# Patient Record
Sex: FEMALE | Race: WHITE | NOT HISPANIC OR LATINO | URBAN - METROPOLITAN AREA
[De-identification: names, ages, dates, MRNs, and addresses within clinical notes are randomized per-mention and may not be internally consistent; named-entity substitution may affect disease eponyms.]

---

## 2017-02-10 ENCOUNTER — EMERGENCY (EMERGENCY)
Facility: HOSPITAL | Age: 42
LOS: 1 days | Discharge: HOME | End: 2017-02-10
Attending: EMERGENCY MEDICINE | Admitting: EMERGENCY MEDICINE
Payer: COMMERCIAL

## 2017-02-10 VITALS
WEIGHT: 100.09 LBS | HEART RATE: 78 BPM | DIASTOLIC BLOOD PRESSURE: 83 MMHG | TEMPERATURE: 98 F | OXYGEN SATURATION: 100 % | SYSTOLIC BLOOD PRESSURE: 121 MMHG | RESPIRATION RATE: 16 BRPM

## 2017-02-10 VITALS
TEMPERATURE: 98 F | RESPIRATION RATE: 15 BRPM | DIASTOLIC BLOOD PRESSURE: 80 MMHG | HEART RATE: 75 BPM | OXYGEN SATURATION: 100 % | SYSTOLIC BLOOD PRESSURE: 128 MMHG

## 2017-02-10 DIAGNOSIS — Z88.2 ALLERGY STATUS TO SULFONAMIDES: ICD-10-CM
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
## 2017-02-10 DIAGNOSIS — S80.02XA CONTUSION OF LEFT KNEE, INITIAL ENCOUNTER: ICD-10-CM

## 2017-02-10 DIAGNOSIS — E03.9 HYPOTHYROIDISM, UNSPECIFIED: ICD-10-CM

## 2017-02-10 DIAGNOSIS — G35 MULTIPLE SCLEROSIS: ICD-10-CM

## 2017-02-10 DIAGNOSIS — S09.90XA UNSPECIFIED INJURY OF HEAD, INITIAL ENCOUNTER: ICD-10-CM

## 2017-02-10 DIAGNOSIS — S50.02XA CONTUSION OF LEFT ELBOW, INITIAL ENCOUNTER: ICD-10-CM

## 2017-02-10 PROCEDURE — 73562 X-RAY EXAM OF KNEE 3: CPT

## 2017-02-10 PROCEDURE — 99284 EMERGENCY DEPT VISIT MOD MDM: CPT

## 2017-02-10 PROCEDURE — 99284 EMERGENCY DEPT VISIT MOD MDM: CPT | Mod: 25

## 2017-02-10 PROCEDURE — 72125 CT NECK SPINE W/O DYE: CPT

## 2017-02-10 PROCEDURE — 73070 X-RAY EXAM OF ELBOW: CPT

## 2017-02-10 PROCEDURE — 72125 CT NECK SPINE W/O DYE: CPT | Mod: 26

## 2017-02-10 PROCEDURE — 73562 X-RAY EXAM OF KNEE 3: CPT | Mod: 26,LT

## 2017-02-10 PROCEDURE — 70450 CT HEAD/BRAIN W/O DYE: CPT | Mod: 26

## 2017-02-10 PROCEDURE — 70450 CT HEAD/BRAIN W/O DYE: CPT

## 2017-02-10 PROCEDURE — 73070 X-RAY EXAM OF ELBOW: CPT | Mod: 26,LT

## 2017-02-10 RX ORDER — ACETAMINOPHEN 500 MG
650 TABLET ORAL ONCE
Qty: 0 | Refills: 0 | Status: COMPLETED | OUTPATIENT
Start: 2017-02-10 | End: 2017-02-10

## 2017-02-10 RX ORDER — IBUPROFEN 200 MG
1 TABLET ORAL
Qty: 20 | Refills: 0 | OUTPATIENT
Start: 2017-02-10 | End: 2017-02-15

## 2017-02-10 RX ADMIN — Medication 650 MILLIGRAM(S): at 11:52

## 2017-02-10 NOTE — ED PROVIDER NOTE - OBJECTIVE STATEMENT
42F with a history of MS, here following mechanical fall. She reports slip and fall on ice, + headstrike, no with sensitivity to light, left elbow and left knee pain. Also with left hand tingling. No LOC. No vomiting. No new weakness. no back pain. No CP or SOB. No abdominal pain. has been ambulatory since fall. Takes no anticoags.

## 2017-02-10 NOTE — ED PROVIDER NOTE - ATTENDING CONTRIBUTION TO CARE
43 yo F p/w slip and fall on ice today, co L knee pain and elbow pain. Pt also hit head. No loc. Mild neck soreness. No numb/ting/focal weak. no cp/sob/palp. no abd pain. no other inj or co.  Exam with no sig scalp edema, small contusion. Mild paraspinal cervical tend.  L elbow contusion with FROM, no laxity. L knee: no eff, FROm with no laxity  Check CT / XR, outpt fu

## 2017-02-10 NOTE — ED PROVIDER NOTE - PROGRESS NOTE DETAILS
findings consistent with MS on head CT and cervical CT which I discussed with patient, some cortical abnormality on elbow xray, will place in sling and recommend ortho f/u

## 2017-02-10 NOTE — ED ADULT NURSE NOTE - OBJECTIVE STATEMENT
pt slipped on ice and fell backwards, hitting her head left elbow and left knee and left buttock. pt presents with headache 3/10, and slight nausea.

## 2017-02-10 NOTE — ED PROVIDER NOTE - CARE PLAN
Principal Discharge DX:	Contusion of left elbow, initial encounter  Secondary Diagnosis:	Contusion of left knee, initial encounter  Secondary Diagnosis:	Head injury due to trauma, initial encounter

## 2017-02-10 NOTE — ED PROVIDER NOTE - MEDICAL DECISION MAKING DETAILS
Here with left knee and elbow pain following fall, tender on exam, will check xray to evaluate for fracture. Also with head injury and mild midline cervical tenderness, will also check head CT and cervical CT, tylenol for pain and observation.

## 2017-02-10 NOTE — ED PROVIDER NOTE - MUSCULOSKELETAL, MLM
lateral left elbow ttp, normal ROM, suprapatella tenderness, good ROM, no effusion, mild midline mid cervical tenderness, no midline thoracic or lumbar tenderness, Spine appears normal, range of motion is not limited, no muscle or joint tenderness

## 2017-02-14 ENCOUNTER — RX RENEWAL (OUTPATIENT)
Age: 42
End: 2017-02-14

## 2017-03-09 ENCOUNTER — APPOINTMENT (OUTPATIENT)
Dept: ENDOCRINOLOGY | Facility: CLINIC | Age: 42
End: 2017-03-09

## 2017-03-09 VITALS
WEIGHT: 107 LBS | DIASTOLIC BLOOD PRESSURE: 74 MMHG | BODY MASS INDEX: 20.46 KG/M2 | HEIGHT: 60.5 IN | HEART RATE: 86 BPM | SYSTOLIC BLOOD PRESSURE: 106 MMHG | OXYGEN SATURATION: 98 %

## 2017-03-09 RX ORDER — IBUPROFEN 600 MG/1
600 TABLET, FILM COATED ORAL
Qty: 20 | Refills: 0 | Status: COMPLETED | COMMUNITY
Start: 2017-02-10

## 2017-03-24 ENCOUNTER — APPOINTMENT (OUTPATIENT)
Dept: MRI IMAGING | Facility: CLINIC | Age: 42
End: 2017-03-24

## 2017-03-24 ENCOUNTER — OUTPATIENT (OUTPATIENT)
Dept: OUTPATIENT SERVICES | Facility: HOSPITAL | Age: 42
LOS: 1 days | End: 2017-03-24
Payer: COMMERCIAL

## 2017-03-24 DIAGNOSIS — Z00.8 ENCOUNTER FOR OTHER GENERAL EXAMINATION: ICD-10-CM

## 2017-03-24 PROCEDURE — A9585: CPT

## 2017-03-24 PROCEDURE — 70553 MRI BRAIN STEM W/O & W/DYE: CPT

## 2017-10-17 ENCOUNTER — APPOINTMENT (OUTPATIENT)
Dept: INTERNAL MEDICINE | Facility: CLINIC | Age: 42
End: 2017-10-17

## 2017-10-24 ENCOUNTER — APPOINTMENT (OUTPATIENT)
Dept: INTERNAL MEDICINE | Facility: CLINIC | Age: 42
End: 2017-10-24
Payer: COMMERCIAL

## 2017-10-24 DIAGNOSIS — Z23 ENCOUNTER FOR IMMUNIZATION: ICD-10-CM

## 2017-10-24 PROCEDURE — G0008: CPT

## 2017-10-24 PROCEDURE — 90686 IIV4 VACC NO PRSV 0.5 ML IM: CPT

## 2017-11-02 ENCOUNTER — APPOINTMENT (OUTPATIENT)
Dept: INTERNAL MEDICINE | Facility: CLINIC | Age: 42
End: 2017-11-02
Payer: COMMERCIAL

## 2017-11-02 VITALS
HEIGHT: 60.5 IN | HEART RATE: 80 BPM | OXYGEN SATURATION: 99 % | SYSTOLIC BLOOD PRESSURE: 120 MMHG | TEMPERATURE: 98 F | BODY MASS INDEX: 19.89 KG/M2 | DIASTOLIC BLOOD PRESSURE: 80 MMHG | WEIGHT: 104 LBS | RESPIRATION RATE: 14 BRPM

## 2017-11-02 PROCEDURE — 99214 OFFICE O/P EST MOD 30 MIN: CPT

## 2017-11-15 ENCOUNTER — RX RENEWAL (OUTPATIENT)
Age: 42
End: 2017-11-15

## 2018-02-14 ENCOUNTER — APPOINTMENT (OUTPATIENT)
Dept: INTERNAL MEDICINE | Facility: CLINIC | Age: 43
End: 2018-02-14
Payer: COMMERCIAL

## 2018-02-14 VITALS
DIASTOLIC BLOOD PRESSURE: 70 MMHG | HEIGHT: 60 IN | RESPIRATION RATE: 14 BRPM | SYSTOLIC BLOOD PRESSURE: 118 MMHG | WEIGHT: 105 LBS | BODY MASS INDEX: 20.62 KG/M2 | TEMPERATURE: 99 F | HEART RATE: 100 BPM | OXYGEN SATURATION: 98 %

## 2018-02-14 LAB — S PYO AG SPEC QL IA: NORMAL

## 2018-02-14 PROCEDURE — 87880 STREP A ASSAY W/OPTIC: CPT | Mod: QW

## 2018-02-14 PROCEDURE — 99214 OFFICE O/P EST MOD 30 MIN: CPT | Mod: 25

## 2018-03-05 ENCOUNTER — RX RENEWAL (OUTPATIENT)
Age: 43
End: 2018-03-05

## 2018-03-15 ENCOUNTER — APPOINTMENT (OUTPATIENT)
Dept: ENDOCRINOLOGY | Facility: CLINIC | Age: 43
End: 2018-03-15
Payer: COMMERCIAL

## 2018-03-15 VITALS
HEART RATE: 88 BPM | OXYGEN SATURATION: 98 % | WEIGHT: 108 LBS | BODY MASS INDEX: 21.2 KG/M2 | HEIGHT: 60 IN | SYSTOLIC BLOOD PRESSURE: 90 MMHG | DIASTOLIC BLOOD PRESSURE: 62 MMHG

## 2018-03-15 PROCEDURE — 99214 OFFICE O/P EST MOD 30 MIN: CPT

## 2018-03-15 RX ORDER — SUMATRIPTAN 100 MG/1
100 TABLET, FILM COATED ORAL
Qty: 9 | Refills: 0 | Status: DISCONTINUED | COMMUNITY
Start: 2017-06-06 | End: 2018-03-15

## 2018-03-15 RX ORDER — AZITHROMYCIN 250 MG/1
250 TABLET, FILM COATED ORAL
Qty: 1 | Refills: 0 | Status: DISCONTINUED | COMMUNITY
Start: 2017-11-02 | End: 2018-03-15

## 2018-10-09 PROBLEM — E03.9 HYPOTHYROIDISM, UNSPECIFIED: Chronic | Status: ACTIVE | Noted: 2017-02-10

## 2018-10-09 PROBLEM — G35 MULTIPLE SCLEROSIS: Chronic | Status: ACTIVE | Noted: 2017-02-10

## 2018-10-25 ENCOUNTER — APPOINTMENT (OUTPATIENT)
Dept: INTERNAL MEDICINE | Facility: CLINIC | Age: 43
End: 2018-10-25
Payer: COMMERCIAL

## 2018-10-25 PROCEDURE — 90686 IIV4 VACC NO PRSV 0.5 ML IM: CPT

## 2018-10-25 PROCEDURE — G0008: CPT

## 2018-12-22 ENCOUNTER — APPOINTMENT (OUTPATIENT)
Dept: INTERNAL MEDICINE | Facility: CLINIC | Age: 43
End: 2018-12-22
Payer: COMMERCIAL

## 2018-12-22 VITALS
OXYGEN SATURATION: 98 % | RESPIRATION RATE: 14 BRPM | HEIGHT: 60 IN | SYSTOLIC BLOOD PRESSURE: 116 MMHG | HEART RATE: 82 BPM | WEIGHT: 110 LBS | DIASTOLIC BLOOD PRESSURE: 64 MMHG | BODY MASS INDEX: 21.6 KG/M2 | TEMPERATURE: 98.3 F

## 2018-12-22 PROCEDURE — 99396 PREV VISIT EST AGE 40-64: CPT

## 2018-12-22 NOTE — HEALTH RISK ASSESSMENT
[Very Good] : ~his/her~  mood as very good [0] : 2) Feeling down, depressed, or hopeless: Not at all (0) [MammogramDate] : 11/18 [PapSmearDate] : 11/18

## 2019-03-21 ENCOUNTER — RX RENEWAL (OUTPATIENT)
Age: 44
End: 2019-03-21

## 2019-04-24 ENCOUNTER — APPOINTMENT (OUTPATIENT)
Dept: ENDOCRINOLOGY | Facility: CLINIC | Age: 44
End: 2019-04-24
Payer: COMMERCIAL

## 2019-04-24 VITALS
DIASTOLIC BLOOD PRESSURE: 64 MMHG | SYSTOLIC BLOOD PRESSURE: 120 MMHG | BODY MASS INDEX: 20.09 KG/M2 | WEIGHT: 112 LBS | HEART RATE: 85 BPM | HEIGHT: 62.5 IN | OXYGEN SATURATION: 99 %

## 2019-04-24 PROCEDURE — 99213 OFFICE O/P EST LOW 20 MIN: CPT

## 2019-04-24 NOTE — PHYSICAL EXAM
[Alert] : alert [No Acute Distress] : no acute distress [Well Nourished] : well nourished [Well Developed] : well developed [Normal Sclera/Conjunctiva] : normal sclera/conjunctiva [No Proptosis] : no proptosis [Normal Oropharynx] : the oropharynx was normal [Thyroid Not Enlarged] : the thyroid was not enlarged [No Thyroid Nodules] : there were no palpable thyroid nodules [No Respiratory Distress] : no respiratory distress [Clear to Auscultation] : lungs were clear to auscultation bilaterally [No Accessory Muscle Use] : no accessory muscle use [Normal Rate] : heart rate was normal  [Regular Rhythm] : with a regular rhythm [Normal S1, S2] : normal S1 and S2 [Normal Bowel Sounds] : normal bowel sounds [Not Tender] : non-tender [Not Distended] : not distended [Anterior Cervical Nodes] : anterior cervical nodes [Soft] : abdomen soft [Normal] : normal and non tender [No Spinal Tenderness] : no spinal tenderness [No Stigmata of Cushings Syndrome] : no stigmata of cushings syndrome [Spine Straight] : spine straight [Normal Gait] : normal gait [Normal Strength/Tone] : muscle strength and tone were normal [No Rash] : no rash [Oriented x3] : oriented to person, place, and time [Normal Reflexes] : deep tendon reflexes were 2+ and symmetric [No Tremors] : no tremors

## 2019-04-25 ENCOUNTER — TRANSCRIPTION ENCOUNTER (OUTPATIENT)
Age: 44
End: 2019-04-25

## 2019-04-26 ENCOUNTER — APPOINTMENT (OUTPATIENT)
Dept: MRI IMAGING | Facility: CLINIC | Age: 44
End: 2019-04-26

## 2019-04-26 NOTE — END OF VISIT
[FreeTextEntry3] : I, Kaye Abdi, authored this note working as a medical scribe for Dr. Sears.  04/24/2019.  3:45PM. \par This note was authored by Kaye Abdi working as medical scribe for me. I have reviewed, edited, and revised the note as needed. I am in agreement with the exam findings, imaging findings, and treatment plan.  Sohan Sears MD

## 2019-04-26 NOTE — HISTORY OF PRESENT ILLNESS
[FreeTextEntry1] : f/u 45 y/o female for hypothyroidism. \par \par No current sx's hypo- or hyperthyroidism except fatigue. No local neck pain or dysphagia. Menses regular.  no other new complaints. \par \par MS stable, sees Neuro , [place].\par \par Injured head - fell on ice FEb 10, 2017, imaging negative had migraines, much improved

## 2019-04-26 NOTE — ASSESSMENT
[FreeTextEntry1] : 43 y/o female with primary hypothyroidism. \par \par Clinically and chemically euthyroid on LT4 75. No local neck pain or dysphagia. Menses regular. Pt does not exercise regularly but will more so in the nicer weather. Sl wt increase which she attributed to stress. \par \par MS, gait stable. Pt will have repeat MRI done by neurology. Pt will have labs done then. \par \par f/u in 1 year. \par

## 2019-09-11 ENCOUNTER — NON-APPOINTMENT (OUTPATIENT)
Age: 44
End: 2019-09-11

## 2019-09-11 ENCOUNTER — APPOINTMENT (OUTPATIENT)
Dept: OPHTHALMOLOGY | Facility: CLINIC | Age: 44
End: 2019-09-11
Payer: COMMERCIAL

## 2019-09-11 PROCEDURE — 92083 EXTENDED VISUAL FIELD XM: CPT

## 2019-09-11 PROCEDURE — 92133 CPTRZD OPH DX IMG PST SGM ON: CPT

## 2019-09-11 PROCEDURE — 92004 COMPRE OPH EXAM NEW PT 1/>: CPT

## 2019-09-26 ENCOUNTER — APPOINTMENT (OUTPATIENT)
Dept: INTERNAL MEDICINE | Facility: CLINIC | Age: 44
End: 2019-09-26
Payer: COMMERCIAL

## 2019-09-26 VITALS
OXYGEN SATURATION: 97 % | HEIGHT: 62.5 IN | TEMPERATURE: 99.1 F | SYSTOLIC BLOOD PRESSURE: 100 MMHG | DIASTOLIC BLOOD PRESSURE: 60 MMHG | RESPIRATION RATE: 14 BRPM | HEART RATE: 75 BPM

## 2019-09-26 PROCEDURE — 99214 OFFICE O/P EST MOD 30 MIN: CPT

## 2019-09-26 RX ORDER — EPINEPHRINE 0.3 MG/.3ML
0.3 INJECTION INTRAMUSCULAR
Qty: 1 | Refills: 1 | Status: ACTIVE | COMMUNITY
Start: 2019-09-26 | End: 1900-01-01

## 2019-09-26 NOTE — PHYSICAL EXAM
[No Acute Distress] : no acute distress [Well Nourished] : well nourished [Well Developed] : well developed [Normal Sclera/Conjunctiva] : normal sclera/conjunctiva [Well-Appearing] : well-appearing [PERRL] : pupils equal round and reactive to light [EOMI] : extraocular movements intact [Normal Oropharynx] : the oropharynx was normal [Normal Outer Ear/Nose] : the outer ears and nose were normal in appearance [No Lymphadenopathy] : no lymphadenopathy [No JVD] : no jugular venous distention [Supple] : supple [Thyroid Normal, No Nodules] : the thyroid was normal and there were no nodules present [No Respiratory Distress] : no respiratory distress  [No Accessory Muscle Use] : no accessory muscle use [Clear to Auscultation] : lungs were clear to auscultation bilaterally [Normal Rate] : normal rate  [Regular Rhythm] : with a regular rhythm [No Murmur] : no murmur heard [Normal S1, S2] : normal S1 and S2 [No Abdominal Bruit] : a ~M bruit was not heard ~T in the abdomen [No Carotid Bruits] : no carotid bruits [No Varicosities] : no varicosities [Pedal Pulses Present] : the pedal pulses are present [No Edema] : there was no peripheral edema [No Extremity Clubbing/Cyanosis] : no extremity clubbing/cyanosis [No Palpable Aorta] : no palpable aorta [Soft] : abdomen soft [Non Tender] : non-tender [Non-distended] : non-distended [No Masses] : no abdominal mass palpated [No HSM] : no HSM [Normal Bowel Sounds] : normal bowel sounds [Normal Posterior Cervical Nodes] : no posterior cervical lymphadenopathy [No CVA Tenderness] : no CVA  tenderness [Normal Anterior Cervical Nodes] : no anterior cervical lymphadenopathy [No Spinal Tenderness] : no spinal tenderness [No Joint Swelling] : no joint swelling [Grossly Normal Strength/Tone] : grossly normal strength/tone [Coordination Grossly Intact] : coordination grossly intact [No Focal Deficits] : no focal deficits [Normal Gait] : normal gait [Deep Tendon Reflexes (DTR)] : deep tendon reflexes were 2+ and symmetric [Normal Affect] : the affect was normal [Normal Insight/Judgement] : insight and judgment were intact [de-identified] : Hives on lower legs, left arm, shoulders

## 2019-09-26 NOTE — HISTORY OF PRESENT ILLNESS
[FreeTextEntry8] : Pt co hives on both lower legs, left arm, both shoulders since yesterday.\par Pt reports that she has been using vitamin E oil for 2-3 days prior to the rash.

## 2019-09-26 NOTE — PLAN
[FreeTextEntry1] : allegra 180mg QD\par See Allergist, Dr. Boxer\par Pt understands that she must go to the ER if throat tightness and/or shortness of breath occur\par Pt knows how to use the epi-pen if necessary

## 2019-10-15 ENCOUNTER — APPOINTMENT (OUTPATIENT)
Dept: FAMILY MEDICINE | Facility: CLINIC | Age: 44
End: 2019-10-15

## 2020-01-14 ENCOUNTER — APPOINTMENT (OUTPATIENT)
Dept: INTERNAL MEDICINE | Facility: CLINIC | Age: 45
End: 2020-01-14
Payer: COMMERCIAL

## 2020-01-14 VITALS
HEIGHT: 62.5 IN | RESPIRATION RATE: 14 BRPM | OXYGEN SATURATION: 98 % | HEART RATE: 76 BPM | DIASTOLIC BLOOD PRESSURE: 84 MMHG | TEMPERATURE: 98.8 F | SYSTOLIC BLOOD PRESSURE: 122 MMHG | BODY MASS INDEX: 19.38 KG/M2 | WEIGHT: 108 LBS

## 2020-01-14 PROCEDURE — 99396 PREV VISIT EST AGE 40-64: CPT

## 2020-01-14 NOTE — HEALTH RISK ASSESSMENT
[Good] : ~his/her~ current health as good [0] : 2) Feeling down, depressed, or hopeless: Not at all (0) [Patient reported mammogram was normal] : Patient reported mammogram was normal [MammogramDate] : 08/19

## 2020-01-14 NOTE — HISTORY OF PRESENT ILLNESS
[de-identified] : Here for PE\par Complains of URI symptoms\par Mood is good.\par History of hypothyroidism- followed by endo\par pain in the injection site of right outer leg

## 2020-01-14 NOTE — ASSESSMENT
[FreeTextEntry1] : Hypothyroidism- follow up with endo\par URI- supportive care.\par check labs.\par Cyst in leg- warm soaks. QID. recheck in  7 days if persist.

## 2020-01-14 NOTE — PHYSICAL EXAM
[Well Nourished] : well nourished [Well Developed] : well developed [No Acute Distress] : no acute distress [Well-Appearing] : well-appearing [PERRL] : pupils equal round and reactive to light [Normal Sclera/Conjunctiva] : normal sclera/conjunctiva [Normal Oropharynx] : the oropharynx was normal [EOMI] : extraocular movements intact [Normal Outer Ear/Nose] : the outer ears and nose were normal in appearance [No Lymphadenopathy] : no lymphadenopathy [Supple] : supple [No JVD] : no jugular venous distention [No Respiratory Distress] : no respiratory distress  [No Accessory Muscle Use] : no accessory muscle use [Thyroid Normal, No Nodules] : the thyroid was normal and there were no nodules present [Normal Rate] : normal rate  [Regular Rhythm] : with a regular rhythm [Clear to Auscultation] : lungs were clear to auscultation bilaterally [Normal S1, S2] : normal S1 and S2 [No Murmur] : no murmur heard [No Abdominal Bruit] : a ~M bruit was not heard ~T in the abdomen [No Varicosities] : no varicosities [No Carotid Bruits] : no carotid bruits [No Palpable Aorta] : no palpable aorta [No Edema] : there was no peripheral edema [Pedal Pulses Present] : the pedal pulses are present [No Extremity Clubbing/Cyanosis] : no extremity clubbing/cyanosis [Soft] : abdomen soft [Non-distended] : non-distended [No Masses] : no abdominal mass palpated [Non Tender] : non-tender [No HSM] : no HSM [Normal Bowel Sounds] : normal bowel sounds [Normal Posterior Cervical Nodes] : no posterior cervical lymphadenopathy [No Spinal Tenderness] : no spinal tenderness [Normal Anterior Cervical Nodes] : no anterior cervical lymphadenopathy [No CVA Tenderness] : no CVA  tenderness [No Rash] : no rash [No Joint Swelling] : no joint swelling [Grossly Normal Strength/Tone] : grossly normal strength/tone [No Focal Deficits] : no focal deficits [Coordination Grossly Intact] : coordination grossly intact [Normal Gait] : normal gait [Deep Tendon Reflexes (DTR)] : deep tendon reflexes were 2+ and symmetric [Normal Insight/Judgement] : insight and judgment were intact [Normal Affect] : the affect was normal [de-identified] : right out leg movable tender small nodule with head

## 2020-01-14 NOTE — REVIEW OF SYSTEMS
[Fatigue] : fatigue [Nasal Discharge] : nasal discharge [Sore Throat] : sore throat [Cough] : cough [Negative] : Genitourinary

## 2020-04-13 ENCOUNTER — RX RENEWAL (OUTPATIENT)
Age: 45
End: 2020-04-13

## 2020-04-27 ENCOUNTER — APPOINTMENT (OUTPATIENT)
Dept: ENDOCRINOLOGY | Facility: CLINIC | Age: 45
End: 2020-04-27

## 2020-04-28 ENCOUNTER — APPOINTMENT (OUTPATIENT)
Dept: ENDOCRINOLOGY | Facility: CLINIC | Age: 45
End: 2020-04-28
Payer: COMMERCIAL

## 2020-04-28 PROCEDURE — 99213 OFFICE O/P EST LOW 20 MIN: CPT | Mod: 95

## 2020-04-28 RX ORDER — FLUTICASONE PROPIONATE 50 UG/1
50 SPRAY, METERED NASAL DAILY
Qty: 1 | Refills: 3 | Status: DISCONTINUED | COMMUNITY
Start: 2017-11-02 | End: 2020-04-28

## 2020-04-28 RX ORDER — METHYLPREDNISOLONE 4 MG/1
4 TABLET ORAL
Qty: 21 | Refills: 0 | Status: DISCONTINUED | COMMUNITY
Start: 2019-09-26 | End: 2020-04-28

## 2020-04-28 NOTE — ASSESSMENT
[FreeTextEntry1] : 44 y/o female with primary hypothyroidism. \par Patient appears clinically euthyroid on levothyroxine 75 mcg daily.\par  . No local neck pain or dysphagia. Menses regular. \par \par Request laboratory test to include repeat thyroid blood test when  patient comfortable with going to local laboratory.\par Levothyroxine renewed.\par f/u in 1 year. \par  [Levothyroxine] : The patient was instructed to take Levothyroxine on an empty stomach, separate from vitamins, and wait at least 30 minutes before eating

## 2020-04-28 NOTE — HISTORY OF PRESENT ILLNESS
[Home] : at home, [unfilled] , at the time of the visit. [Medical Office: ([facility])___] : at the medical office located in  [Patient] : the patient [Self] : self [FreeTextEntry1] : f/u 44 y/o female for hypothyroidism. \par Currently on levothyroxine 75 mcg daily.\par No current sx's hypo- or hyperthyroidism except fatigue. No local neck pain or dysphagia. Menses regular.  no other new complaints. \par \par MS stable, sees Neuro , [place].\par Remains premenopausal, menses regular, sees Dr. Anne\par

## 2020-08-12 ENCOUNTER — TRANSCRIPTION ENCOUNTER (OUTPATIENT)
Age: 45
End: 2020-08-12

## 2020-08-13 ENCOUNTER — TRANSCRIPTION ENCOUNTER (OUTPATIENT)
Age: 45
End: 2020-08-13

## 2020-09-04 ENCOUNTER — TRANSCRIPTION ENCOUNTER (OUTPATIENT)
Age: 45
End: 2020-09-04

## 2020-11-19 ENCOUNTER — NON-APPOINTMENT (OUTPATIENT)
Age: 45
End: 2020-11-19

## 2021-01-13 ENCOUNTER — TRANSCRIPTION ENCOUNTER (OUTPATIENT)
Age: 46
End: 2021-01-13

## 2021-02-09 ENCOUNTER — APPOINTMENT (OUTPATIENT)
Dept: INTERNAL MEDICINE | Facility: CLINIC | Age: 46
End: 2021-02-09
Payer: COMMERCIAL

## 2021-02-09 ENCOUNTER — NON-APPOINTMENT (OUTPATIENT)
Age: 46
End: 2021-02-09

## 2021-02-09 VITALS
DIASTOLIC BLOOD PRESSURE: 72 MMHG | RESPIRATION RATE: 14 BRPM | WEIGHT: 107 LBS | SYSTOLIC BLOOD PRESSURE: 106 MMHG | HEART RATE: 99 BPM | HEIGHT: 62.5 IN | OXYGEN SATURATION: 98 % | TEMPERATURE: 97.2 F | BODY MASS INDEX: 19.2 KG/M2

## 2021-02-09 PROCEDURE — 93000 ELECTROCARDIOGRAM COMPLETE: CPT

## 2021-02-09 PROCEDURE — 99396 PREV VISIT EST AGE 40-64: CPT | Mod: 25

## 2021-02-09 PROCEDURE — 99072 ADDL SUPL MATRL&STAF TM PHE: CPT

## 2021-02-09 RX ORDER — FESOTERODINE FUMARATE 8 MG/1
TABLET, FILM COATED, EXTENDED RELEASE ORAL
Refills: 0 | Status: DISCONTINUED | COMMUNITY
End: 2021-02-09

## 2021-02-09 RX ORDER — DIROXIMEL FUMARATE 231 MG/1
231 CAPSULE ORAL
Qty: 120 | Refills: 0 | Status: ACTIVE | COMMUNITY
Start: 2020-11-18

## 2021-02-09 RX ORDER — DIAZEPAM 5 MG/1
5 TABLET ORAL
Qty: 2 | Refills: 0 | Status: ACTIVE | COMMUNITY
Start: 2021-02-02

## 2021-02-09 RX ORDER — TROSPIUM CHLORIDE 60 MG/1
60 CAPSULE, EXTENDED RELEASE ORAL
Qty: 90 | Refills: 0 | Status: ACTIVE | COMMUNITY
Start: 2020-09-09

## 2021-02-09 RX ORDER — SILODOSIN 8 MG/1
8 CAPSULE ORAL
Refills: 0 | Status: ACTIVE | COMMUNITY
Start: 2020-10-29

## 2021-02-09 NOTE — PHYSICAL EXAM
[No Acute Distress] : no acute distress [Well Nourished] : well nourished [Well Developed] : well developed [Well-Appearing] : well-appearing [Normal Sclera/Conjunctiva] : normal sclera/conjunctiva [PERRL] : pupils equal round and reactive to light [EOMI] : extraocular movements intact [Normal Outer Ear/Nose] : the outer ears and nose were normal in appearance [No JVD] : no jugular venous distention [Normal Oropharynx] : the oropharynx was normal [No Lymphadenopathy] : no lymphadenopathy [Supple] : supple [Thyroid Normal, No Nodules] : the thyroid was normal and there were no nodules present [No Respiratory Distress] : no respiratory distress  [No Accessory Muscle Use] : no accessory muscle use [Clear to Auscultation] : lungs were clear to auscultation bilaterally [Normal Rate] : normal rate  [Regular Rhythm] : with a regular rhythm [Normal S1, S2] : normal S1 and S2 [No Murmur] : no murmur heard [No Carotid Bruits] : no carotid bruits [No Abdominal Bruit] : a ~M bruit was not heard ~T in the abdomen [No Varicosities] : no varicosities [Pedal Pulses Present] : the pedal pulses are present [No Palpable Aorta] : no palpable aorta [No Edema] : there was no peripheral edema [No Extremity Clubbing/Cyanosis] : no extremity clubbing/cyanosis [Soft] : abdomen soft [Non Tender] : non-tender [Non-distended] : non-distended [No Masses] : no abdominal mass palpated [No HSM] : no HSM [Normal Bowel Sounds] : normal bowel sounds [Normal Posterior Cervical Nodes] : no posterior cervical lymphadenopathy [Normal Anterior Cervical Nodes] : no anterior cervical lymphadenopathy [No CVA Tenderness] : no CVA  tenderness [No Spinal Tenderness] : no spinal tenderness [No Joint Swelling] : no joint swelling [Grossly Normal Strength/Tone] : grossly normal strength/tone [No Rash] : no rash [Coordination Grossly Intact] : coordination grossly intact [No Focal Deficits] : no focal deficits [Normal Gait] : normal gait [Deep Tendon Reflexes (DTR)] : deep tendon reflexes were 2+ and symmetric [Normal Affect] : the affect was normal [Normal Insight/Judgement] : insight and judgment were intact

## 2021-02-09 NOTE — ASSESSMENT
[FreeTextEntry1] : MS- follow up with  neuro\par Depression/ anxiety- follow up with psychiatry\par hypothyroidism- follow up with endo\par Check labs \par asthma- prn \par HCM [facility]

## 2021-02-09 NOTE — HEALTH RISK ASSESSMENT
[Good] : ~his/her~  mood as  good [] : No [Yes] : Yes [Monthly or less (1 pt)] : Monthly or less (1 point) [0] : 2) Feeling down, depressed, or hopeless: Not at all (0) [MammogramDate] : 08/20

## 2021-02-09 NOTE — HISTORY OF PRESENT ILLNESS
[de-identified] : Pt followed by neuro for MS ( [place])\par Followed by psychiatry ( Dayne Segura)\par Followed by hypothyroidism ( Renu)

## 2021-02-10 ENCOUNTER — TRANSCRIPTION ENCOUNTER (OUTPATIENT)
Age: 46
End: 2021-02-10

## 2021-02-11 NOTE — ED PROVIDER NOTE - AGGRAVATING FACTORS
Writer went into room to obtain blood work. Pt in room washing socks in the sink and requesting a robe. Pt given another hospital gown to wear as robe. Requesting writer come back in five minutes to obtain blood.   light

## 2021-03-30 ENCOUNTER — RX RENEWAL (OUTPATIENT)
Age: 46
End: 2021-03-30

## 2021-03-31 ENCOUNTER — TRANSCRIPTION ENCOUNTER (OUTPATIENT)
Age: 46
End: 2021-03-31

## 2021-05-03 ENCOUNTER — TRANSCRIPTION ENCOUNTER (OUTPATIENT)
Age: 46
End: 2021-05-03

## 2021-06-21 ENCOUNTER — RX RENEWAL (OUTPATIENT)
Age: 46
End: 2021-06-21

## 2021-06-29 ENCOUNTER — APPOINTMENT (OUTPATIENT)
Dept: ENDOCRINOLOGY | Facility: CLINIC | Age: 46
End: 2021-06-29
Payer: COMMERCIAL

## 2021-06-29 VITALS
TEMPERATURE: 100.1 F | OXYGEN SATURATION: 98 % | DIASTOLIC BLOOD PRESSURE: 64 MMHG | HEIGHT: 62.5 IN | WEIGHT: 108 LBS | SYSTOLIC BLOOD PRESSURE: 102 MMHG | BODY MASS INDEX: 19.38 KG/M2 | HEART RATE: 103 BPM

## 2021-06-29 PROCEDURE — 99072 ADDL SUPL MATRL&STAF TM PHE: CPT

## 2021-06-29 PROCEDURE — 99214 OFFICE O/P EST MOD 30 MIN: CPT

## 2021-06-29 NOTE — END OF VISIT
[FreeTextEntry3] : I, Bautista Styles, authored this note working as a medical scribe for Dr. Sears.  06/29/2021.  3:15PM. This note was authored by the medical scribe for me. I have reviewed, edited, and revised the note as needed. I am in agreement with the exam findings, imaging findings, and treatment plan.  Sohan Sears MD

## 2021-06-29 NOTE — HISTORY OF PRESENT ILLNESS
[Home] : at home, [unfilled] , at the time of the visit. [Medical Office: ([facility])___] : at the medical office located in  [Patient] : the patient [Self] : self [Vitamin D (oral)] : Vitamin D orally [FreeTextEntry1] : No significant interval health changes. No interval surgery, hospitalizations, fractures, or change in medications.\par \par Currently on levothyroxine 75 mcg daily. No local neck pain. No dysphagia or dysphonia. No raciness, shakiness, heat/cold intolerance, tiredness, or fatigue. No palpitations, tremors, or sudden weight gain/loss.\par \par Pt takes Vitamin D 2000 IU daily.\par \par Remains premenopausal, menses regular, sees Dr. Anne.\par \par MS stable, sees Neurology at [place]. Had resection of skin lesion at site of prior Avonex injections, R thigh lateral, benign

## 2021-06-29 NOTE — ASSESSMENT
[Levothyroxine] : The patient was instructed to take Levothyroxine on an empty stomach, separate from vitamins, and wait at least 30 minutes before eating [FreeTextEntry1] : 45 y/o female with primary hypothyroidism.\par \par Patient appears clinically euthyroid on levothyroxine 75 mcg daily. No local neck pain. No dysphagia or dysphonia. No raciness, shakiness, heat/cold intolerance, tiredness, or fatigue. No palpitations, tremors, or sudden weight gain/loss. Menses regular. Recent TSH slightly elevated. Repeat TFTs.\par \par Labs 3/2021 reviewed: TSH 4.16, normal.\par History of low vitamin D restarted vitamin D 2000 units/day.  Repeat vitamin D levels.\par Request labs sent out. Pt will have labs done w/ Dr. Lelo Mckeon.\par \par F/u in 1 year

## 2021-07-12 NOTE — ED PROVIDER NOTE - DURATION
Patient has a procedure on 7/26. She is wondering if she should take her nebulizer treatment that morning. She is going to have a root canal a week after the procedure and is wondering if this is okay.     Thank you   
Writer returned call & spoke with pt. All questions answered. Pt verbalized understanding and thankful for call.   
today

## 2021-09-14 ENCOUNTER — TRANSCRIPTION ENCOUNTER (OUTPATIENT)
Age: 46
End: 2021-09-14

## 2021-09-14 RX ORDER — VENLAFAXINE HYDROCHLORIDE 75 MG/1
75 CAPSULE, EXTENDED RELEASE ORAL
Refills: 0 | Status: DISCONTINUED | COMMUNITY
End: 2021-09-14

## 2021-09-15 ENCOUNTER — TRANSCRIPTION ENCOUNTER (OUTPATIENT)
Age: 46
End: 2021-09-15

## 2021-09-15 RX ORDER — DESVENLAFAXINE 25 MG/1
25 TABLET, EXTENDED RELEASE ORAL
Qty: 30 | Refills: 0 | Status: DISCONTINUED | COMMUNITY
Start: 2020-10-07 | End: 2021-09-15

## 2021-10-14 ENCOUNTER — APPOINTMENT (OUTPATIENT)
Dept: ULTRASOUND IMAGING | Facility: CLINIC | Age: 46
End: 2021-10-14
Payer: COMMERCIAL

## 2021-10-14 ENCOUNTER — APPOINTMENT (OUTPATIENT)
Dept: MAMMOGRAPHY | Facility: CLINIC | Age: 46
End: 2021-10-14
Payer: COMMERCIAL

## 2021-10-14 PROCEDURE — 76641 ULTRASOUND BREAST COMPLETE: CPT | Mod: 50

## 2021-10-14 PROCEDURE — 77067 SCR MAMMO BI INCL CAD: CPT

## 2021-10-14 PROCEDURE — 77063 BREAST TOMOSYNTHESIS BI: CPT

## 2021-11-15 ENCOUNTER — RX RENEWAL (OUTPATIENT)
Age: 46
End: 2021-11-15

## 2022-01-03 ENCOUNTER — TRANSCRIPTION ENCOUNTER (OUTPATIENT)
Age: 47
End: 2022-01-03

## 2022-01-19 ENCOUNTER — TRANSCRIPTION ENCOUNTER (OUTPATIENT)
Age: 47
End: 2022-01-19

## 2022-01-20 ENCOUNTER — TRANSCRIPTION ENCOUNTER (OUTPATIENT)
Age: 47
End: 2022-01-20

## 2022-02-15 ENCOUNTER — APPOINTMENT (OUTPATIENT)
Dept: INTERNAL MEDICINE | Facility: CLINIC | Age: 47
End: 2022-02-15
Payer: COMMERCIAL

## 2022-02-15 VITALS
WEIGHT: 108 LBS | HEART RATE: 101 BPM | BODY MASS INDEX: 19.38 KG/M2 | HEIGHT: 62.5 IN | RESPIRATION RATE: 14 BRPM | DIASTOLIC BLOOD PRESSURE: 66 MMHG | SYSTOLIC BLOOD PRESSURE: 118 MMHG | TEMPERATURE: 98.3 F | OXYGEN SATURATION: 99 %

## 2022-02-15 PROCEDURE — 99396 PREV VISIT EST AGE 40-64: CPT

## 2022-02-15 RX ORDER — TOPIRAMATE 25 MG/1
25 CAPSULE, COATED PELLETS ORAL
Qty: 60 | Refills: 0 | Status: DISCONTINUED | COMMUNITY
Start: 2021-01-05 | End: 2022-02-15

## 2022-02-15 NOTE — ASSESSMENT
[FreeTextEntry1] : Healthy female- check labs and EKG\par Hypothyroidism- follow up with endo\par VICTORINA- stable on med\par Migraines- stable on meds

## 2022-02-15 NOTE — PHYSICAL EXAM
[No Acute Distress] : no acute distress [Well Nourished] : well nourished [Well Developed] : well developed [Well-Appearing] : well-appearing [Normal Sclera/Conjunctiva] : normal sclera/conjunctiva [PERRL] : pupils equal round and reactive to light [EOMI] : extraocular movements intact [Normal Outer Ear/Nose] : the outer ears and nose were normal in appearance [Normal Oropharynx] : the oropharynx was normal [No JVD] : no jugular venous distention [No Lymphadenopathy] : no lymphadenopathy [Supple] : supple [Thyroid Normal, No Nodules] : the thyroid was normal and there were no nodules present [No Respiratory Distress] : no respiratory distress  [No Accessory Muscle Use] : no accessory muscle use [Clear to Auscultation] : lungs were clear to auscultation bilaterally [Normal Rate] : normal rate  [Regular Rhythm] : with a regular rhythm [Normal S1, S2] : normal S1 and S2 [No Murmur] : no murmur heard [No Carotid Bruits] : no carotid bruits [No Abdominal Bruit] : a ~M bruit was not heard ~T in the abdomen [No Varicosities] : no varicosities [Pedal Pulses Present] : the pedal pulses are present [No Edema] : there was no peripheral edema [No Palpable Aorta] : no palpable aorta [No Extremity Clubbing/Cyanosis] : no extremity clubbing/cyanosis [Soft] : abdomen soft [Non Tender] : non-tender [Non-distended] : non-distended [No Masses] : no abdominal mass palpated [No HSM] : no HSM [Normal Bowel Sounds] : normal bowel sounds [Normal Posterior Cervical Nodes] : no posterior cervical lymphadenopathy [Normal Anterior Cervical Nodes] : no anterior cervical lymphadenopathy [No CVA Tenderness] : no CVA  tenderness [No Spinal Tenderness] : no spinal tenderness [No Joint Swelling] : no joint swelling [Grossly Normal Strength/Tone] : grossly normal strength/tone [No Rash] : no rash [Coordination Grossly Intact] : coordination grossly intact [No Focal Deficits] : no focal deficits [Normal Gait] : normal gait [Deep Tendon Reflexes (DTR)] : deep tendon reflexes were 2+ and symmetric [Normal Affect] : the affect was normal [Normal Insight/Judgement] : insight and judgment were intact no

## 2022-02-16 LAB
APPEARANCE: CLEAR
BACTERIA: NEGATIVE
BILIRUBIN URINE: NEGATIVE
BLOOD URINE: NEGATIVE
COLOR: COLORLESS
GLUCOSE QUALITATIVE U: NEGATIVE
HYALINE CASTS: 0 /LPF
KETONES URINE: NEGATIVE
LEUKOCYTE ESTERASE URINE: ABNORMAL
MICROSCOPIC-UA: NORMAL
NITRITE URINE: NEGATIVE
PH URINE: 7
PROTEIN URINE: NEGATIVE
RED BLOOD CELLS URINE: 0 /HPF
SPECIFIC GRAVITY URINE: 1.01
SQUAMOUS EPITHELIAL CELLS: 4 /HPF
UROBILINOGEN URINE: NORMAL
WHITE BLOOD CELLS URINE: 5 /HPF

## 2022-06-30 ENCOUNTER — APPOINTMENT (OUTPATIENT)
Dept: ENDOCRINOLOGY | Facility: CLINIC | Age: 47
End: 2022-06-30

## 2022-06-30 VITALS
BODY MASS INDEX: 19.38 KG/M2 | OXYGEN SATURATION: 98 % | DIASTOLIC BLOOD PRESSURE: 72 MMHG | SYSTOLIC BLOOD PRESSURE: 100 MMHG | TEMPERATURE: 98.2 F | RESPIRATION RATE: 16 BRPM | WEIGHT: 108 LBS | HEART RATE: 94 BPM | HEIGHT: 62.5 IN

## 2022-06-30 PROCEDURE — 99213 OFFICE O/P EST LOW 20 MIN: CPT

## 2022-06-30 RX ORDER — ALBUTEROL SULFATE 90 UG/1
108 (90 BASE) INHALANT RESPIRATORY (INHALATION)
Qty: 1 | Refills: 2 | Status: DISCONTINUED | COMMUNITY
Start: 2022-02-15 | End: 2022-06-30

## 2022-06-30 RX ORDER — ASPIRIN 81 MG
81 TABLET, DELAYED RELEASE (ENTERIC COATED) ORAL
Refills: 0 | Status: ACTIVE | COMMUNITY

## 2022-06-30 RX ORDER — B-COMPLEX WITH VITAMIN C
TABLET ORAL
Refills: 0 | Status: ACTIVE | COMMUNITY

## 2022-06-30 RX ORDER — CLOBETASOL PROPIONATE 0.5 MG/ML
0.05 SOLUTION TOPICAL
Qty: 25 | Refills: 0 | Status: COMPLETED | COMMUNITY
Start: 2022-02-03

## 2022-07-01 NOTE — HISTORY OF PRESENT ILLNESS
[Vitamin D (oral)] : Vitamin D orally [FreeTextEntry1] : Patient returns for a follow up visit for  hypothyroidism. MS is stable. Pt reports fatigue. Pt takes baby aspirin once a day. Since the last visit pt has no significant interval health changes. No interval surgery, hospitalizations, fractures, or change in medications.Thyroid levels were slightly off. \par \par History\par Currently on levothyroxine 75 mcg daily. No local neck pain. No dysphagia or dysphonia. No raciness, shakiness, heat/cold intolerance, tiredness, or fatigue. No palpitations, tremors, or sudden weight gain/loss.\par \par Pt takes Vitamin D 2000 IU daily.\par \par Remains premenopausal, menses regular, sees Dr. Anne.\par \par MS stable, sees Neurology at [place]. Had resection of skin lesion at site of prior Avonex injections, R thigh lateral, benign

## 2022-07-01 NOTE — END OF VISIT
[FreeTextEntry3] : This note was authored by the medical scribe for me. I have reviewed, edited, and revised the note as needed. I am in agreement with the exam findings, imaging findings, and treatment plan.  Sohan Sears MD

## 2022-07-01 NOTE — ASSESSMENT
[Levothyroxine] : The patient was instructed to take Levothyroxine on an empty stomach, separate from vitamins, and wait at least 30 minutes before eating [FreeTextEntry1] : 46 y/o female with primary hypothyroidism.\par \par Patient appears clinically euthyroid on levothyroxine 75 mcg daily. No local neck pain. No dysphagia or dysphonia. No raciness, shakiness, heat/cold intolerance, tiredness, or fatigue. No palpitations, tremors, or sudden weight gain/loss. Menses regular. Recent TSH slightly elevated. Repeat TFTs.\par \par Thyroid medication dose will be changes as a result of thyroid blood tests. \par \par Labs 3/2021 reviewed: TSH 4.16, normal.\par History of low vitamin D restarted vitamin D 2000 units/day.  Repeat vitamin D levels.\par Request labs sent out. Pt will have labs done w/ Dr. Lelo Mckeon.\par April Tsh 8.42\par Vitamin d 35.3\par \par \par F/u in 1 year

## 2022-07-01 NOTE — PHYSICAL EXAM
[Alert] : alert [Well Nourished] : well nourished [No Acute Distress] : no acute distress [Well Developed] : well developed [Normal Sclera/Conjunctiva] : normal sclera/conjunctiva [EOMI] : extra ocular movement intact [No Proptosis] : no proptosis [Thyroid Not Enlarged] : the thyroid was not enlarged [No Thyroid Nodules] : no palpable thyroid nodules [Clear to Auscultation] : lungs were clear to auscultation bilaterally [Normal S1, S2] : normal S1 and S2 [Normal Rate] : heart rate was normal [Regular Rhythm] : with a regular rhythm [No Edema] : no peripheral edema [Normal Bowel Sounds] : normal bowel sounds [Not Tender] : non-tender [Not Distended] : not distended [Soft] : abdomen soft [Normal Anterior Cervical Nodes] : no anterior cervical lymphadenopathy [No Spinal Tenderness] : no spinal tenderness [Spine Straight] : spine straight [No Stigmata of Cushings Syndrome] : no stigmata of Cushings Syndrome [Normal Gait] : normal gait [Normal Reflexes] : deep tendon reflexes were 2+ and symmetric [No Tremors] : no tremors [Oriented x3] : oriented to person, place, and time [de-identified] : scar right lateral thigh

## 2022-07-14 ENCOUNTER — TRANSCRIPTION ENCOUNTER (OUTPATIENT)
Age: 47
End: 2022-07-14

## 2022-07-19 ENCOUNTER — NON-APPOINTMENT (OUTPATIENT)
Age: 47
End: 2022-07-19

## 2022-07-27 ENCOUNTER — TRANSCRIPTION ENCOUNTER (OUTPATIENT)
Age: 47
End: 2022-07-27

## 2022-07-28 ENCOUNTER — TRANSCRIPTION ENCOUNTER (OUTPATIENT)
Age: 47
End: 2022-07-28

## 2022-08-15 ENCOUNTER — RX RENEWAL (OUTPATIENT)
Age: 47
End: 2022-08-15

## 2022-10-17 ENCOUNTER — TRANSCRIPTION ENCOUNTER (OUTPATIENT)
Age: 47
End: 2022-10-17

## 2022-10-18 ENCOUNTER — TRANSCRIPTION ENCOUNTER (OUTPATIENT)
Age: 47
End: 2022-10-18

## 2022-10-19 ENCOUNTER — NON-APPOINTMENT (OUTPATIENT)
Age: 47
End: 2022-10-19

## 2022-10-19 ENCOUNTER — APPOINTMENT (OUTPATIENT)
Dept: ULTRASOUND IMAGING | Facility: CLINIC | Age: 47
End: 2022-10-19

## 2022-10-19 ENCOUNTER — APPOINTMENT (OUTPATIENT)
Dept: MAMMOGRAPHY | Facility: CLINIC | Age: 47
End: 2022-10-19

## 2022-10-19 PROCEDURE — 76641 ULTRASOUND BREAST COMPLETE: CPT | Mod: 50

## 2022-10-19 PROCEDURE — 77063 BREAST TOMOSYNTHESIS BI: CPT

## 2022-10-19 PROCEDURE — 77067 SCR MAMMO BI INCL CAD: CPT

## 2022-10-26 ENCOUNTER — APPOINTMENT (OUTPATIENT)
Dept: INTERNAL MEDICINE | Facility: CLINIC | Age: 47
End: 2022-10-26

## 2022-10-26 VITALS
BODY MASS INDEX: 20.62 KG/M2 | WEIGHT: 105 LBS | OXYGEN SATURATION: 98 % | SYSTOLIC BLOOD PRESSURE: 110 MMHG | HEIGHT: 60 IN | TEMPERATURE: 97.6 F | RESPIRATION RATE: 16 BRPM | DIASTOLIC BLOOD PRESSURE: 70 MMHG | HEART RATE: 91 BPM

## 2022-10-26 PROCEDURE — 99213 OFFICE O/P EST LOW 20 MIN: CPT

## 2022-10-26 NOTE — HISTORY OF PRESENT ILLNESS
[de-identified] : Pt here today for renewal of meds.\par Pt with anxiety due to situation.\par Pt unable to sleep at night.

## 2022-12-20 ENCOUNTER — RX RENEWAL (OUTPATIENT)
Age: 47
End: 2022-12-20

## 2023-01-10 ENCOUNTER — NON-APPOINTMENT (OUTPATIENT)
Age: 48
End: 2023-01-10

## 2023-01-10 ENCOUNTER — TRANSCRIPTION ENCOUNTER (OUTPATIENT)
Age: 48
End: 2023-01-10

## 2023-01-11 ENCOUNTER — TRANSCRIPTION ENCOUNTER (OUTPATIENT)
Age: 48
End: 2023-01-11

## 2023-01-11 ENCOUNTER — APPOINTMENT (OUTPATIENT)
Dept: INTERNAL MEDICINE | Facility: CLINIC | Age: 48
End: 2023-01-11

## 2023-01-12 ENCOUNTER — TRANSCRIPTION ENCOUNTER (OUTPATIENT)
Age: 48
End: 2023-01-12

## 2023-01-13 ENCOUNTER — APPOINTMENT (OUTPATIENT)
Dept: INTERNAL MEDICINE | Facility: CLINIC | Age: 48
End: 2023-01-13
Payer: COMMERCIAL

## 2023-01-13 VITALS
SYSTOLIC BLOOD PRESSURE: 98 MMHG | OXYGEN SATURATION: 99 % | TEMPERATURE: 99.1 F | DIASTOLIC BLOOD PRESSURE: 50 MMHG | HEIGHT: 60 IN | HEART RATE: 105 BPM | BODY MASS INDEX: 19.63 KG/M2 | WEIGHT: 100 LBS

## 2023-01-13 PROCEDURE — 99214 OFFICE O/P EST MOD 30 MIN: CPT

## 2023-01-13 NOTE — ASSESSMENT
[FreeTextEntry1] : acute URI- most likely viral URI. Pt to get bloods and viral swab\par Supportive care for now and return if persist.\par Chest xray done at urgent care which was negative.\par

## 2023-01-13 NOTE — HISTORY OF PRESENT ILLNESS
[FreeTextEntry8] : Pt with sinus pressure post nasal drip and cough.\par SYmptoms for 10 days.\par Went to urgent care and given doxycycline\par Pt feeling a little bitter\par less cough and congestion\par still with aches and some mild low grade temps.

## 2023-01-13 NOTE — REVIEW OF SYSTEMS
[Postnasal Drip] : postnasal drip [Cough] : cough [Fatigue] : fatigue [Nasal Discharge] : nasal discharge [Sore Throat] : sore throat [Negative] : Heme/Lymph

## 2023-01-17 ENCOUNTER — TRANSCRIPTION ENCOUNTER (OUTPATIENT)
Age: 48
End: 2023-01-17

## 2023-01-17 LAB
ALBUMIN SERPL ELPH-MCNC: 4.5 G/DL
ALP BLD-CCNC: 67 U/L
ALT SERPL-CCNC: 15 U/L
ANION GAP SERPL CALC-SCNC: 9 MMOL/L
AST SERPL-CCNC: 13 U/L
BASOPHILS # BLD AUTO: 0.04 K/UL
BASOPHILS NFR BLD AUTO: 0.5 %
BILIRUB SERPL-MCNC: 0.3 MG/DL
BUN SERPL-MCNC: 10 MG/DL
CALCIUM SERPL-MCNC: 9.8 MG/DL
CHLORIDE SERPL-SCNC: 101 MMOL/L
CMV IGG SERPL QL: <0.2 U/ML
CMV IGG SERPL-IMP: NEGATIVE
CMV IGM SERPL QL: <8 AU/ML
CMV IGM SERPL QL: NEGATIVE
CO2 SERPL-SCNC: 28 MMOL/L
CREAT SERPL-MCNC: 0.7 MG/DL
EBV EA AB SER IA-ACNC: >150 U/ML
EBV EA AB TITR SER IF: POSITIVE
EBV EA IGG SER QL IA: >600 U/ML
EBV EA IGG SER-ACNC: POSITIVE
EBV EA IGM SER IA-ACNC: POSITIVE
EBV PATRN SPEC IB-IMP: NORMAL
EBV VCA IGG SER IA-ACNC: 472 U/ML
EBV VCA IGM SER QL IA: 98.1 U/ML
EGFR: 107 ML/MIN/1.73M2
EOSINOPHIL # BLD AUTO: 0.06 K/UL
EOSINOPHIL NFR BLD AUTO: 0.7 %
EPSTEIN-BARR VIRUS CAPSID ANTIGEN IGG: POSITIVE
GLUCOSE SERPL-MCNC: 83 MG/DL
HCT VFR BLD CALC: 44.7 %
HGB BLD-MCNC: 14.8 G/DL
IMM GRANULOCYTES NFR BLD AUTO: 0.6 %
INFLUENZA A RESULT: NOT DETECTED
INFLUENZA B RESULT: NOT DETECTED
LYMPHOCYTES # BLD AUTO: 0.91 K/UL
LYMPHOCYTES NFR BLD AUTO: 11.3 %
MAN DIFF?: NORMAL
MCHC RBC-ENTMCNC: 30.1 PG
MCHC RBC-ENTMCNC: 33.1 GM/DL
MCV RBC AUTO: 90.9 FL
MONOCYTES # BLD AUTO: 0.49 K/UL
MONOCYTES NFR BLD AUTO: 6.1 %
NEUTROPHILS # BLD AUTO: 6.52 K/UL
NEUTROPHILS NFR BLD AUTO: 80.8 %
PLATELET # BLD AUTO: 377 K/UL
POTASSIUM SERPL-SCNC: 5.2 MMOL/L
PROT SERPL-MCNC: 7.2 G/DL
RBC # BLD: 4.92 M/UL
RBC # FLD: 13.1 %
RESP SYN VIRUS RESULT: NOT DETECTED
SARS-COV-2 RESULT: NOT DETECTED
SODIUM SERPL-SCNC: 138 MMOL/L
WBC # FLD AUTO: 8.07 K/UL

## 2023-01-18 ENCOUNTER — TRANSCRIPTION ENCOUNTER (OUTPATIENT)
Age: 48
End: 2023-01-18

## 2023-01-20 ENCOUNTER — TRANSCRIPTION ENCOUNTER (OUTPATIENT)
Age: 48
End: 2023-01-20

## 2023-02-28 ENCOUNTER — NON-APPOINTMENT (OUTPATIENT)
Age: 48
End: 2023-02-28

## 2023-02-28 ENCOUNTER — APPOINTMENT (OUTPATIENT)
Dept: INTERNAL MEDICINE | Facility: CLINIC | Age: 48
End: 2023-02-28
Payer: COMMERCIAL

## 2023-02-28 VITALS
RESPIRATION RATE: 16 BRPM | OXYGEN SATURATION: 99 % | HEIGHT: 60 IN | BODY MASS INDEX: 21.79 KG/M2 | DIASTOLIC BLOOD PRESSURE: 56 MMHG | SYSTOLIC BLOOD PRESSURE: 108 MMHG | WEIGHT: 111 LBS | HEART RATE: 96 BPM

## 2023-02-28 PROCEDURE — 93000 ELECTROCARDIOGRAM COMPLETE: CPT | Mod: 59

## 2023-02-28 PROCEDURE — 99396 PREV VISIT EST AGE 40-64: CPT | Mod: 25

## 2023-02-28 NOTE — HEALTH RISK ASSESSMENT
[Very Good] : ~his/her~  mood as very good [No falls in past year] : Patient reported no falls in the past year [0] : 2) Feeling down, depressed, or hopeless: Not at all (0) [PHQ-2 Negative - No further assessment needed] : PHQ-2 Negative - No further assessment needed [[id number]] : 0 [Patient reported mammogram was normal] : Patient reported mammogram was normal [Patient reported PAP Smear was normal] : Patient reported PAP Smear was normal [MammogramDate] : 10/22 [PapSmearDate] : 04/22 [ColonoscopyComments] : inez to see sayedy

## 2023-03-14 ENCOUNTER — LABORATORY RESULT (OUTPATIENT)
Age: 48
End: 2023-03-14

## 2023-03-14 ENCOUNTER — APPOINTMENT (OUTPATIENT)
Dept: GASTROENTEROLOGY | Facility: CLINIC | Age: 48
End: 2023-03-14
Payer: COMMERCIAL

## 2023-03-14 VITALS
HEIGHT: 60 IN | WEIGHT: 110 LBS | SYSTOLIC BLOOD PRESSURE: 104 MMHG | HEART RATE: 87 BPM | BODY MASS INDEX: 21.6 KG/M2 | DIASTOLIC BLOOD PRESSURE: 70 MMHG | OXYGEN SATURATION: 98 %

## 2023-03-14 DIAGNOSIS — K58.9 IRRITABLE BOWEL SYNDROME W/OUT DIARRHEA: ICD-10-CM

## 2023-03-14 DIAGNOSIS — Z12.12 ENCOUNTER FOR SCREENING FOR MALIGNANT NEOPLASM OF COLON: ICD-10-CM

## 2023-03-14 DIAGNOSIS — G35 MULTIPLE SCLEROSIS: ICD-10-CM

## 2023-03-14 DIAGNOSIS — J45.909 UNSPECIFIED ASTHMA, UNCOMPLICATED: ICD-10-CM

## 2023-03-14 DIAGNOSIS — Z12.11 ENCOUNTER FOR SCREENING FOR MALIGNANT NEOPLASM OF COLON: ICD-10-CM

## 2023-03-14 PROCEDURE — 99204 OFFICE O/P NEW MOD 45 MIN: CPT

## 2023-03-14 NOTE — ASSESSMENT
[FreeTextEntry1] : IMPRESSION:  \par #  Due for a screening test for colon polyps/early stage colon cancer\par -  Patient denies family history of GI cancers.\par -  No large colon polyps in the FDR\par -  Never had a colonoscopy \par \par #  IBS - irregular BMs.  \par -  EGD by Dr. Jonah Slater 2013:  Nml esophagus (neg path).  Mild antral erythema s/p bx (neg for hp and IM).  Nml duodenum s/p bx (neg for celiac disease). \par \par #  Comorbidities:  History of anxiety, asthma, hypothyroidism, Multiple sclerosis, optic neuritis \par \par \par PLAN: \par I had a long discussion with the patient regarding colon cancer in the [place].    \par \par We reviewed risk factors for colon cancer which include age, having a family hx of colon cancer, cancer syndromes, personal hx of inflammatory disease, having comorbidities such as obesity, DM, cardiac disease, having nicotine addiction, alcohol addiction,etc.  \par \par Patient is average risk for colon cancer.  \par \par We reviewed the screening tests for colon cancer prevention.  We discussed screening tests such as stool test, CT scans such as CT colonography, and a colonoscopy.    Patient was made aware that despite these screening test, a cancerous lesion can still be missed.  The gold standard test is a screening colonoscopy.\par \par I have advised the patient to arrange for a colonoscopy to rule out colon polyps, colorectal cancer etc. under monitored anesthesia care.  Risks such as perforation requiring surgery, colostomy, bleeding requiring blood transfusion, infection/sepsis, diverticulitis, colitis, missed colon cancer (2% to 6%), internal organ injury such as splenic hemorrhage etc, adverse reaction to medication etc. and risks of anesthesia including cardiopulmonary compromise requiring ICU care were discussed with patient.  Alternatives were discussed.  Patient verbalized understanding and agrees to proceed with a colonoscopy under anesthesia.\par

## 2023-03-14 NOTE — PHYSICAL EXAM
[Bowel Sounds] : normal bowel sounds [Abdomen Tenderness] : non-tender [Abdomen Soft] : soft [No Masses] : no abdominal mass palpated [Cervical Lymph Nodes Enlarged Posterior Bilaterally] : no posterior cervical lymphadenopathy [Supraclavicular Lymph Nodes Enlarged Bilaterally] : no supraclavicular lymphadenopathy [Abnormal Walk] : normal gait [No Clubbing, Cyanosis] : no clubbing or cyanosis of the fingernails [Normal Color / Pigmentation] : normal skin color and pigmentation [] : no rash [Normal] : oriented to person, place, and time

## 2023-03-14 NOTE — HISTORY OF PRESENT ILLNESS
[FreeTextEntry1] : 49 y/o mother of two, with Hx of anxiety, asthma, hypothyroidism, IBS, Multiple sclerosis, optic neuritis who presents for a screening colonoscopy. \par \par Never had a colonoscopy.\par Feels well. Was diagnosed with Mono - lost weight and then gained back.  \par Has a history of irregular bowel movements - diagnosed with IBS in past. At times wakes up in the middle of the night to have a bowel movement.\par \par Sometimes heartburn.  Patient denies having difficulty swallowing, painful swallowing, nausea, vomiting, melena and hematochezia.\par \par Aunt passed away in her late 20s from cancer of unknown origin.  \par Patient denies family history of GI cancers.\par All other review of systems are negative. \par \par \par Initial visit with Dr. Jonah Slater in 6/2013: \par "I have a history of stomach issues."\par \par Since 1-2/13 worse. Not bad now, but 20 year history of getting a "stomach ache out of nowhere" followed by many trips to bathroom, and then will be bad for a few days and then fine for anywhere from a few weeks to many months.\par \par Since 2/3 was having multiple episodes. Would treat self with Pepto or Imodium and then would be "clogged up" for a week.\par \par When gets an attack of pain, has to get to bathroom urgently. Worse since had children (greater urgency). Has had incontinence x 3 in the past 5 years. Two since 2/12.\par \par Pain: above umbilicus, sharp, burning, crampy. Resolves (usually) with BM. Can be "on and off the toilet for the next hour." \par \par Stress increases frequency of attacks. \par Foods: creamy or heavy foods. But the episodes that bring her in are not predictable. \par \par No nocturnal episodes. Does not know if has had blood in stool. Mother has IBS. \par  \par \par \par EGD by Dr. Jonah Slater 2013:  Nml esophagus (neg path).  Mild antral erythema s/p bx (neg for hp and IM).  Nml duodenum s/p bx (neg for celiac disease).

## 2023-03-15 ENCOUNTER — TRANSCRIPTION ENCOUNTER (OUTPATIENT)
Age: 48
End: 2023-03-15

## 2023-03-16 ENCOUNTER — TRANSCRIPTION ENCOUNTER (OUTPATIENT)
Age: 48
End: 2023-03-16

## 2023-05-15 NOTE — ED PROVIDER NOTE - CONSTITUTIONAL NEGATIVE STATEMENT, MLM
no fever and no chills. Nsaids Pregnancy And Lactation Text: These medications are considered safe up to 30 weeks gestation. It is excreted in breast milk.

## 2023-05-21 ENCOUNTER — TRANSCRIPTION ENCOUNTER (OUTPATIENT)
Age: 48
End: 2023-05-21

## 2023-05-22 ENCOUNTER — APPOINTMENT (OUTPATIENT)
Dept: GASTROENTEROLOGY | Facility: HOSPITAL | Age: 48
End: 2023-05-22

## 2023-05-22 ENCOUNTER — OUTPATIENT (OUTPATIENT)
Dept: OUTPATIENT SERVICES | Facility: HOSPITAL | Age: 48
LOS: 1 days | End: 2023-05-22
Payer: COMMERCIAL

## 2023-05-22 DIAGNOSIS — Z12.11 ENCOUNTER FOR SCREENING FOR MALIGNANT NEOPLASM OF COLON: ICD-10-CM

## 2023-05-22 LAB — HCG UR QL: NEGATIVE — SIGNIFICANT CHANGE UP

## 2023-05-22 PROCEDURE — 45378 DIAGNOSTIC COLONOSCOPY: CPT

## 2023-05-22 PROCEDURE — G0121: CPT

## 2023-05-22 PROCEDURE — 81025 URINE PREGNANCY TEST: CPT

## 2023-06-23 ENCOUNTER — TRANSCRIPTION ENCOUNTER (OUTPATIENT)
Age: 48
End: 2023-06-23

## 2023-06-23 ENCOUNTER — NON-APPOINTMENT (OUTPATIENT)
Age: 48
End: 2023-06-23

## 2023-06-27 ENCOUNTER — APPOINTMENT (OUTPATIENT)
Dept: INTERNAL MEDICINE | Facility: CLINIC | Age: 48
End: 2023-06-27
Payer: COMMERCIAL

## 2023-06-27 PROCEDURE — 99212 OFFICE O/P EST SF 10 MIN: CPT | Mod: 95

## 2023-06-27 NOTE — HISTORY OF PRESENT ILLNESS
[de-identified] : Pt having anxiety.\par Pt having upcoming trip to [place] and anxious about the trip.\par Pt with difficulty with sleep.\par Mood is good [Home] : at home, [unfilled] , at the time of the visit. [Medical Office: ([facility])___] : at the medical office located in  [Verbal consent obtained from patient] : the patient, [unfilled]

## 2023-06-27 NOTE — ASSESSMENT
[FreeTextEntry1] : anxiety- xanax renewed. Only to use sparingly,. istop checked\par Time spent on phone and charting 18 minutes

## 2023-06-28 ENCOUNTER — APPOINTMENT (OUTPATIENT)
Dept: ENDOCRINOLOGY | Facility: CLINIC | Age: 48
End: 2023-06-28
Payer: COMMERCIAL

## 2023-06-28 VITALS
OXYGEN SATURATION: 98 % | HEIGHT: 60 IN | SYSTOLIC BLOOD PRESSURE: 98 MMHG | DIASTOLIC BLOOD PRESSURE: 70 MMHG | HEART RATE: 104 BPM

## 2023-06-28 VITALS — BODY MASS INDEX: 21.09 KG/M2 | WEIGHT: 108 LBS

## 2023-06-28 PROCEDURE — 99213 OFFICE O/P EST LOW 20 MIN: CPT

## 2023-06-28 RX ORDER — SODIUM SULFATE, POTASSIUM SULFATE AND MAGNESIUM SULFATE 1.6; 3.13; 17.5 G/177ML; G/177ML; G/177ML
17.5-3.13-1.6 SOLUTION ORAL
Qty: 1 | Refills: 0 | Status: DISCONTINUED | COMMUNITY
Start: 2023-03-14 | End: 2023-06-28

## 2023-06-28 RX ORDER — RIMEGEPANT SULFATE 75 MG/75MG
TABLET, ORALLY DISINTEGRATING ORAL
Qty: 16 | Refills: 0 | Status: ACTIVE | COMMUNITY
Start: 2023-05-02

## 2023-06-28 RX ORDER — NITROFURANTOIN (MONOHYDRATE/MACROCRYSTALS) 25; 75 MG/1; MG/1
100 CAPSULE ORAL
Qty: 14 | Refills: 0 | Status: COMPLETED | COMMUNITY
Start: 2023-06-05

## 2023-06-28 RX ORDER — METRONIDAZOLE 7.5 MG/G
0.75 GEL VAGINAL
Qty: 70 | Refills: 0 | Status: COMPLETED | COMMUNITY
Start: 2023-05-25

## 2023-06-28 RX ORDER — DOXYCYCLINE HYCLATE 100 MG/1
100 CAPSULE ORAL
Qty: 14 | Refills: 0 | Status: COMPLETED | COMMUNITY
Start: 2023-01-08

## 2023-06-29 NOTE — PHYSICAL EXAM
[Alert] : alert [Well Nourished] : well nourished [No Acute Distress] : no acute distress [Well Developed] : well developed [Normal Sclera/Conjunctiva] : normal sclera/conjunctiva [EOMI] : extra ocular movement intact [No Proptosis] : no proptosis [Thyroid Not Enlarged] : the thyroid was not enlarged [No Thyroid Nodules] : no palpable thyroid nodules [Clear to Auscultation] : lungs were clear to auscultation bilaterally [Normal S1, S2] : normal S1 and S2 [Normal Rate] : heart rate was normal [Regular Rhythm] : with a regular rhythm [No Edema] : no peripheral edema [Normal Bowel Sounds] : normal bowel sounds [Not Tender] : non-tender [Not Distended] : not distended [Soft] : abdomen soft [Normal Anterior Cervical Nodes] : no anterior cervical lymphadenopathy [No Spinal Tenderness] : no spinal tenderness [Spine Straight] : spine straight [No Stigmata of Cushings Syndrome] : no stigmata of Cushings Syndrome [Normal Gait] : normal gait [Normal Reflexes] : deep tendon reflexes were 2+ and symmetric [No Tremors] : no tremors [Oriented x3] : oriented to person, place, and time [de-identified] : scar right lateral thigh

## 2023-06-29 NOTE — ASSESSMENT
[Levothyroxine] : The patient was instructed to take Levothyroxine on an empty stomach, separate from vitamins, and wait at least 30 minutes before eating [FreeTextEntry1] : 47 y/o female with primary hypothyroidism.\par \par Patient appears clinically euthyroid on levothyroxine 75 mcg daily. No local neck pain. No dysphagia or dysphonia. No raciness, shakiness, heat/cold intolerance, tiredness, or fatigue. No palpitations, tremors, or sudden weight gain/loss. Menses regular. Recent TSH normal 2.85 \par Perimenopausal with some hot flashes.  Patient is not considering medical therapy such as contraceptives to control symptoms during this time phase. \par \par F/u in 1 year

## 2023-06-29 NOTE — HISTORY OF PRESENT ILLNESS
[Vitamin D (oral)] : Vitamin D orally [FreeTextEntry1] : Patient returns for a follow up visit for  hypothyroidism. MS is stable. Pt reports fatigue. Pt takes baby aspirin once a day. Since the last visit pt has no significant interval health changes. No interval surgery, hospitalizations, fractures, or change in medications.Thyroid levels were slightly off. \par \par History\par Currently on levothyroxine 75 mcg daily. No local neck pain. No dysphagia or dysphonia. No raciness, shakiness, heat/cold intolerance, tiredness, or fatigue. No palpitations, tremors, or sudden weight gain/loss.\par \par Pt takes Vitamin D 2000 IU daily.\par Perimenopausal, irreg menses with hot flashes, has not disc options such as OBCP\par \par MS stable, sees Neurology at [place].

## 2023-10-19 ENCOUNTER — APPOINTMENT (OUTPATIENT)
Dept: ULTRASOUND IMAGING | Facility: CLINIC | Age: 48
End: 2023-10-19
Payer: COMMERCIAL

## 2023-10-19 ENCOUNTER — APPOINTMENT (OUTPATIENT)
Dept: MAMMOGRAPHY | Facility: CLINIC | Age: 48
End: 2023-10-19
Payer: COMMERCIAL

## 2023-10-19 PROCEDURE — 77063 BREAST TOMOSYNTHESIS BI: CPT

## 2023-10-19 PROCEDURE — 76641 ULTRASOUND BREAST COMPLETE: CPT | Mod: 50

## 2023-10-19 PROCEDURE — 77067 SCR MAMMO BI INCL CAD: CPT

## 2024-03-01 ENCOUNTER — APPOINTMENT (OUTPATIENT)
Dept: INTERNAL MEDICINE | Facility: CLINIC | Age: 49
End: 2024-03-01
Payer: COMMERCIAL

## 2024-03-01 VITALS
HEART RATE: 92 BPM | BODY MASS INDEX: 22.78 KG/M2 | OXYGEN SATURATION: 98 % | TEMPERATURE: 98.4 F | HEIGHT: 60 IN | RESPIRATION RATE: 16 BRPM | WEIGHT: 116 LBS | SYSTOLIC BLOOD PRESSURE: 118 MMHG | DIASTOLIC BLOOD PRESSURE: 74 MMHG

## 2024-03-01 DIAGNOSIS — F41.9 ANXIETY DISORDER, UNSPECIFIED: ICD-10-CM

## 2024-03-01 DIAGNOSIS — Z00.00 ENCOUNTER FOR GENERAL ADULT MEDICAL EXAMINATION W/OUT ABNORMAL FINDINGS: ICD-10-CM

## 2024-03-01 DIAGNOSIS — E55.9 VITAMIN D DEFICIENCY, UNSPECIFIED: ICD-10-CM

## 2024-03-01 PROCEDURE — 99396 PREV VISIT EST AGE 40-64: CPT

## 2024-03-01 RX ORDER — UBROGEPANT 100 MG/1
100 TABLET ORAL
Qty: 24 | Refills: 0 | Status: DISCONTINUED | COMMUNITY
Start: 2022-02-16 | End: 2024-03-01

## 2024-03-01 RX ORDER — ALPRAZOLAM 0.5 MG/1
0.5 TABLET ORAL
Qty: 30 | Refills: 0 | Status: ACTIVE | COMMUNITY
Start: 2019-09-26 | End: 1900-01-01

## 2024-03-01 RX ORDER — DESVENLAFAXINE 50 MG/1
50 TABLET, EXTENDED RELEASE ORAL
Qty: 90 | Refills: 3 | Status: ACTIVE | COMMUNITY
Start: 2021-09-15 | End: 1900-01-01

## 2024-03-01 NOTE — PHYSICAL EXAM
[No Acute Distress] : no acute distress [Well Nourished] : well nourished [Well Developed] : well developed [Well-Appearing] : well-appearing [Normal Sclera/Conjunctiva] : normal sclera/conjunctiva [EOMI] : extraocular movements intact [PERRL] : pupils equal round and reactive to light [Normal Oropharynx] : the oropharynx was normal [Normal Outer Ear/Nose] : the outer ears and nose were normal in appearance [No Lymphadenopathy] : no lymphadenopathy [No JVD] : no jugular venous distention [Supple] : supple [No Respiratory Distress] : no respiratory distress  [Thyroid Normal, No Nodules] : the thyroid was normal and there were no nodules present [No Accessory Muscle Use] : no accessory muscle use [Clear to Auscultation] : lungs were clear to auscultation bilaterally [Normal Rate] : normal rate  [Regular Rhythm] : with a regular rhythm [Normal S1, S2] : normal S1 and S2 [No Murmur] : no murmur heard [No Carotid Bruits] : no carotid bruits [No Abdominal Bruit] : a ~M bruit was not heard ~T in the abdomen [No Varicosities] : no varicosities [No Edema] : there was no peripheral edema [Pedal Pulses Present] : the pedal pulses are present [No Extremity Clubbing/Cyanosis] : no extremity clubbing/cyanosis [No Palpable Aorta] : no palpable aorta [Non Tender] : non-tender [Soft] : abdomen soft [Non-distended] : non-distended [No Masses] : no abdominal mass palpated [No HSM] : no HSM [Normal Bowel Sounds] : normal bowel sounds [Normal Posterior Cervical Nodes] : no posterior cervical lymphadenopathy [Normal Anterior Cervical Nodes] : no anterior cervical lymphadenopathy [No CVA Tenderness] : no CVA  tenderness [No Spinal Tenderness] : no spinal tenderness [Grossly Normal Strength/Tone] : grossly normal strength/tone [No Joint Swelling] : no joint swelling [No Rash] : no rash [Coordination Grossly Intact] : coordination grossly intact [Normal Gait] : normal gait [No Focal Deficits] : no focal deficits [Deep Tendon Reflexes (DTR)] : deep tendon reflexes were 2+ and symmetric [Normal Affect] : the affect was normal [Normal Insight/Judgement] : insight and judgment were intact

## 2024-03-01 NOTE — HEALTH RISK ASSESSMENT
[Very Good] : ~his/her~  mood as very good [No falls in past year] : Patient reported no falls in the past year [0] : 1) Little interest or pleasure doing things: Not at all (0) [[id number]] : 0 [Patient reported mammogram was normal] : Patient reported mammogram was normal [Patient reported colonoscopy was normal] : Patient reported colonoscopy was normal [MammogramDate] : 10/23 [ColonoscopyDate] : 05/23

## 2024-03-05 LAB
25(OH)D3 SERPL-MCNC: 35.8 NG/ML
ALBUMIN SERPL ELPH-MCNC: 4.5 G/DL
ALP BLD-CCNC: 68 U/L
ALT SERPL-CCNC: 11 U/L
ANION GAP SERPL CALC-SCNC: 10 MMOL/L
AST SERPL-CCNC: 11 U/L
BASOPHILS # BLD AUTO: 0.02 K/UL
BASOPHILS NFR BLD AUTO: 0.6 %
BILIRUB SERPL-MCNC: 0.4 MG/DL
BUN SERPL-MCNC: 10 MG/DL
CALCIUM SERPL-MCNC: 9.5 MG/DL
CHLORIDE SERPL-SCNC: 101 MMOL/L
CHOLEST SERPL-MCNC: 215 MG/DL
CO2 SERPL-SCNC: 27 MMOL/L
CREAT SERPL-MCNC: 0.68 MG/DL
EGFR: 107 ML/MIN/1.73M2
EOSINOPHIL # BLD AUTO: 0.1 K/UL
EOSINOPHIL NFR BLD AUTO: 3 %
ESTIMATED AVERAGE GLUCOSE: 97 MG/DL
FERRITIN SERPL-MCNC: 34 NG/ML
FOLATE SERPL-MCNC: 9 NG/ML
GLUCOSE SERPL-MCNC: 90 MG/DL
HBA1C MFR BLD HPLC: 5 %
HCT VFR BLD CALC: 41.2 %
HDLC SERPL-MCNC: 88 MG/DL
HGB BLD-MCNC: 13.6 G/DL
IMM GRANULOCYTES NFR BLD AUTO: 0.3 %
IRON SATN MFR SERPL: 20 %
IRON SERPL-MCNC: 79 UG/DL
LDLC SERPL CALC-MCNC: 115 MG/DL
LYMPHOCYTES # BLD AUTO: 0.9 K/UL
LYMPHOCYTES NFR BLD AUTO: 27.1 %
MAN DIFF?: NORMAL
MCHC RBC-ENTMCNC: 30.6 PG
MCHC RBC-ENTMCNC: 33 GM/DL
MCV RBC AUTO: 92.6 FL
MONOCYTES # BLD AUTO: 0.35 K/UL
MONOCYTES NFR BLD AUTO: 10.5 %
NEUTROPHILS # BLD AUTO: 1.94 K/UL
NEUTROPHILS NFR BLD AUTO: 58.5 %
NONHDLC SERPL-MCNC: 127 MG/DL
PLATELET # BLD AUTO: 304 K/UL
POTASSIUM SERPL-SCNC: 4.6 MMOL/L
PROT SERPL-MCNC: 7 G/DL
RBC # BLD: 4.45 M/UL
RBC # FLD: 13.6 %
SODIUM SERPL-SCNC: 138 MMOL/L
TIBC SERPL-MCNC: 393 UG/DL
TRIGL SERPL-MCNC: 66 MG/DL
TSH SERPL-ACNC: 2.08 UIU/ML
UIBC SERPL-MCNC: 313 UG/DL
VIT B12 SERPL-MCNC: 1454 PG/ML
WBC # FLD AUTO: 3.32 K/UL

## 2024-06-26 ENCOUNTER — APPOINTMENT (OUTPATIENT)
Dept: ENDOCRINOLOGY | Facility: CLINIC | Age: 49
End: 2024-06-26
Payer: COMMERCIAL

## 2024-06-26 VITALS
WEIGHT: 110 LBS | OXYGEN SATURATION: 98 % | BODY MASS INDEX: 21.6 KG/M2 | HEIGHT: 60 IN | SYSTOLIC BLOOD PRESSURE: 120 MMHG | DIASTOLIC BLOOD PRESSURE: 84 MMHG | HEART RATE: 104 BPM

## 2024-06-26 DIAGNOSIS — E03.9 HYPOTHYROIDISM, UNSPECIFIED: ICD-10-CM

## 2024-06-26 PROCEDURE — 99213 OFFICE O/P EST LOW 20 MIN: CPT

## 2024-07-05 ENCOUNTER — RX RENEWAL (OUTPATIENT)
Age: 49
End: 2024-07-05

## 2024-08-05 ENCOUNTER — TRANSCRIPTION ENCOUNTER (OUTPATIENT)
Age: 49
End: 2024-08-05

## 2024-08-07 ENCOUNTER — TRANSCRIPTION ENCOUNTER (OUTPATIENT)
Age: 49
End: 2024-08-07

## 2024-09-26 ENCOUNTER — TRANSCRIPTION ENCOUNTER (OUTPATIENT)
Age: 49
End: 2024-09-26

## 2024-10-23 ENCOUNTER — APPOINTMENT (OUTPATIENT)
Dept: ULTRASOUND IMAGING | Facility: CLINIC | Age: 49
End: 2024-10-23
Payer: COMMERCIAL

## 2024-10-23 ENCOUNTER — APPOINTMENT (OUTPATIENT)
Dept: MAMMOGRAPHY | Facility: CLINIC | Age: 49
End: 2024-10-23
Payer: COMMERCIAL

## 2024-10-23 PROCEDURE — 76641 ULTRASOUND BREAST COMPLETE: CPT | Mod: 50

## 2024-10-23 PROCEDURE — 77063 BREAST TOMOSYNTHESIS BI: CPT

## 2024-10-23 PROCEDURE — 77067 SCR MAMMO BI INCL CAD: CPT

## 2024-12-09 ENCOUNTER — TRANSCRIPTION ENCOUNTER (OUTPATIENT)
Age: 49
End: 2024-12-09

## 2024-12-17 ENCOUNTER — TRANSCRIPTION ENCOUNTER (OUTPATIENT)
Age: 49
End: 2024-12-17

## 2024-12-17 ENCOUNTER — RX RENEWAL (OUTPATIENT)
Age: 49
End: 2024-12-17

## 2025-02-27 ENCOUNTER — NON-APPOINTMENT (OUTPATIENT)
Age: 50
End: 2025-02-27

## 2025-03-04 ENCOUNTER — NON-APPOINTMENT (OUTPATIENT)
Age: 50
End: 2025-03-04

## 2025-03-04 ENCOUNTER — APPOINTMENT (OUTPATIENT)
Dept: INTERNAL MEDICINE | Facility: CLINIC | Age: 50
End: 2025-03-04
Payer: COMMERCIAL

## 2025-03-04 VITALS
HEIGHT: 60 IN | BODY MASS INDEX: 24.94 KG/M2 | DIASTOLIC BLOOD PRESSURE: 78 MMHG | TEMPERATURE: 98.3 F | WEIGHT: 127 LBS | SYSTOLIC BLOOD PRESSURE: 118 MMHG | RESPIRATION RATE: 16 BRPM | HEART RATE: 96 BPM | OXYGEN SATURATION: 97 %

## 2025-03-04 DIAGNOSIS — E03.9 HYPOTHYROIDISM, UNSPECIFIED: ICD-10-CM

## 2025-03-04 DIAGNOSIS — E55.9 VITAMIN D DEFICIENCY, UNSPECIFIED: ICD-10-CM

## 2025-03-04 DIAGNOSIS — Z00.00 ENCOUNTER FOR GENERAL ADULT MEDICAL EXAMINATION W/OUT ABNORMAL FINDINGS: ICD-10-CM

## 2025-03-04 PROCEDURE — 99396 PREV VISIT EST AGE 40-64: CPT

## 2025-03-04 PROCEDURE — 93000 ELECTROCARDIOGRAM COMPLETE: CPT

## 2025-03-04 RX ORDER — ESTRADIOL 1 MG/1
1 TABLET ORAL
Refills: 0 | Status: ACTIVE | COMMUNITY

## 2025-03-04 RX ORDER — PROGESTERONE 200 MG/1
200 CAPSULE ORAL
Refills: 0 | Status: ACTIVE | COMMUNITY

## 2025-03-05 LAB
25(OH)D3 SERPL-MCNC: 40.8 NG/ML
ALBUMIN SERPL ELPH-MCNC: 4.6 G/DL
ALP BLD-CCNC: 92 U/L
ALT SERPL-CCNC: 9 U/L
ANION GAP SERPL CALC-SCNC: 15 MMOL/L
APPEARANCE: CLEAR
AST SERPL-CCNC: 12 U/L
BACTERIA: ABNORMAL /HPF
BASOPHILS # BLD AUTO: 0.02 K/UL
BASOPHILS NFR BLD AUTO: 0.3 %
BILIRUB SERPL-MCNC: 0.2 MG/DL
BILIRUBIN URINE: NEGATIVE
BLOOD URINE: NEGATIVE
BUN SERPL-MCNC: 18 MG/DL
CALCIUM SERPL-MCNC: 9.4 MG/DL
CAST: NORMAL /LPF
CHLORIDE SERPL-SCNC: 101 MMOL/L
CHOLEST SERPL-MCNC: 209 MG/DL
CO2 SERPL-SCNC: 24 MMOL/L
COLOR: YELLOW
CREAT SERPL-MCNC: 0.87 MG/DL
EGFRCR SERPLBLD CKD-EPI 2021: 81 ML/MIN/1.73M2
EOSINOPHIL # BLD AUTO: 0.13 K/UL
EOSINOPHIL NFR BLD AUTO: 1.8 %
EPITHELIAL CELLS: 22 /HPF
ESTIMATED AVERAGE GLUCOSE: 100 MG/DL
FOLATE SERPL-MCNC: 12.5 NG/ML
GLUCOSE QUALITATIVE U: NEGATIVE MG/DL
GLUCOSE SERPL-MCNC: 84 MG/DL
HBA1C MFR BLD HPLC: 5.1 %
HCT VFR BLD CALC: 41.1 %
HDLC SERPL-MCNC: 68 MG/DL
HGB BLD-MCNC: 13.5 G/DL
IMM GRANULOCYTES NFR BLD AUTO: 0.1 %
KETONES URINE: NEGATIVE MG/DL
LDLC SERPL CALC-MCNC: 117 MG/DL
LEUKOCYTE ESTERASE URINE: NEGATIVE
LYMPHOCYTES # BLD AUTO: 1.63 K/UL
LYMPHOCYTES NFR BLD AUTO: 22.6 %
MAN DIFF?: NORMAL
MCHC RBC-ENTMCNC: 29.9 PG
MCHC RBC-ENTMCNC: 32.8 G/DL
MCV RBC AUTO: 91.1 FL
MICROSCOPIC-UA: NORMAL
MONOCYTES # BLD AUTO: 0.44 K/UL
MONOCYTES NFR BLD AUTO: 6.1 %
NEUTROPHILS # BLD AUTO: 4.98 K/UL
NEUTROPHILS NFR BLD AUTO: 69.1 %
NITRITE URINE: NEGATIVE
NONHDLC SERPL-MCNC: 141 MG/DL
PH URINE: 6
PLATELET # BLD AUTO: 328 K/UL
POTASSIUM SERPL-SCNC: 4.7 MMOL/L
PROT SERPL-MCNC: 7.1 G/DL
PROTEIN URINE: NEGATIVE MG/DL
RBC # BLD: 4.51 M/UL
RBC # FLD: 13.7 %
RED BLOOD CELLS URINE: NORMAL /HPF
REVIEW: NORMAL
SODIUM SERPL-SCNC: 140 MMOL/L
SPECIFIC GRAVITY URINE: 1.02
TRIGL SERPL-MCNC: 137 MG/DL
TSH SERPL-ACNC: 2.6 UIU/ML
UROBILINOGEN URINE: 0.2 MG/DL
VIT B12 SERPL-MCNC: >2000 PG/ML
WBC # FLD AUTO: 7.21 K/UL
WHITE BLOOD CELLS URINE: 1 /HPF

## 2025-05-28 ENCOUNTER — RX RENEWAL (OUTPATIENT)
Age: 50
End: 2025-05-28

## 2025-06-04 ENCOUNTER — TRANSCRIPTION ENCOUNTER (OUTPATIENT)
Age: 50
End: 2025-06-04

## 2025-06-05 ENCOUNTER — TRANSCRIPTION ENCOUNTER (OUTPATIENT)
Age: 50
End: 2025-06-05

## 2025-07-09 ENCOUNTER — RX RENEWAL (OUTPATIENT)
Age: 50
End: 2025-07-09

## 2025-08-07 ENCOUNTER — APPOINTMENT (OUTPATIENT)
Dept: ENDOCRINOLOGY | Facility: CLINIC | Age: 50
End: 2025-08-07
Payer: COMMERCIAL

## 2025-08-07 ENCOUNTER — NON-APPOINTMENT (OUTPATIENT)
Age: 50
End: 2025-08-07

## 2025-08-07 VITALS
WEIGHT: 114 LBS | HEIGHT: 60.1 IN | DIASTOLIC BLOOD PRESSURE: 62 MMHG | SYSTOLIC BLOOD PRESSURE: 116 MMHG | BODY MASS INDEX: 22.09 KG/M2 | HEART RATE: 80 BPM | OXYGEN SATURATION: 98 %

## 2025-08-07 DIAGNOSIS — E03.9 HYPOTHYROIDISM, UNSPECIFIED: ICD-10-CM

## 2025-08-07 DIAGNOSIS — M81.0 AGE-RELATED OSTEOPOROSIS W/OUT CURRENT PATHOLOGICAL FRACTURE: ICD-10-CM

## 2025-08-07 PROCEDURE — 77080 DXA BONE DENSITY AXIAL: CPT

## 2025-08-07 PROCEDURE — ZZZZZ: CPT

## 2025-08-07 PROCEDURE — 99214 OFFICE O/P EST MOD 30 MIN: CPT

## 2025-08-07 PROCEDURE — G2211 COMPLEX E/M VISIT ADD ON: CPT | Mod: NC

## 2025-08-08 LAB
ALBUMIN SERPL ELPH-MCNC: 4.6 G/DL
ALP BLD-CCNC: 87 U/L
ALT SERPL-CCNC: 12 U/L
ANION GAP SERPL CALC-SCNC: 14 MMOL/L
AST SERPL-CCNC: 16 U/L
BILIRUB SERPL-MCNC: 0.3 MG/DL
BUN SERPL-MCNC: 13 MG/DL
CALCIUM SERPL-MCNC: 9.9 MG/DL
CALCIUM SERPL-MCNC: 9.9 MG/DL
CHLORIDE SERPL-SCNC: 99 MMOL/L
CO2 SERPL-SCNC: 25 MMOL/L
CREAT SERPL-MCNC: 0.74 MG/DL
EGFRCR SERPLBLD CKD-EPI 2021: 98 ML/MIN/1.73M2
GLUCOSE SERPL-MCNC: 85 MG/DL
PARATHYROID HORMONE INTACT: 44 PG/ML
PHOSPHATE SERPL-MCNC: 3.6 MG/DL
POTASSIUM SERPL-SCNC: 5.1 MMOL/L
PROT SERPL-MCNC: 7.3 G/DL
SODIUM SERPL-SCNC: 137 MMOL/L
T4 FREE SERPL-MCNC: 1.3 NG/DL
TSH SERPL-ACNC: 1.57 UIU/ML

## 2025-08-14 ENCOUNTER — TRANSCRIPTION ENCOUNTER (OUTPATIENT)
Age: 50
End: 2025-08-14

## 2025-08-14 LAB — COLLAGEN CTX SERPL-MCNC: 369 PG/ML

## (undated) DEVICE — FORCEP RADIAL JAW 4 W NDL 2.4MM 2.8MM 240CM ORANGE DISP

## (undated) DEVICE — SNARE CAPTIVATOR II RND COLD 10MM

## (undated) DEVICE — CANISTER SUCTION 1200CC 10/SL

## (undated) DEVICE — MARKER ENDO SPOT EX

## (undated) DEVICE — CATH IV SAFE BC 20G X 1.16" (PINK)

## (undated) DEVICE — TRAP QUICK CATCH  SINGL CHAMBER

## (undated) DEVICE — TUBING IV SET SECONDARY 34"

## (undated) DEVICE — MASK OXYGEN PANORAMIC

## (undated) DEVICE — SUT HEWSON RETRIEVER

## (undated) DEVICE — SOL IRR POUR H2O 500ML

## (undated) DEVICE — ENDOCUFF VISION SZ 2 LG GRN

## (undated) DEVICE — NDL INJ SCLERO INTERJECT 23G

## (undated) DEVICE — FORMALIN CUPS 10% BUFFERED

## (undated) DEVICE — TUBING CANNULA SALTER LABS NASAL ADULT 7FT

## (undated) DEVICE — MASK O2 NON REBREATH 3IN1 ADULT

## (undated) DEVICE — POLY TRAP ETRAP

## (undated) DEVICE — TUBING IV SET GRAVITY 3Y 100" MACRO

## (undated) DEVICE — SNARE LRG

## (undated) DEVICE — ENDOCUFF VISION SZ 3 SM PRPL

## (undated) DEVICE — VALVE BIOPSY

## (undated) DEVICE — TUBE RECTAL 24FR

## (undated) DEVICE — SYR IV POSIFLUSH NS 3ML 30/TY

## (undated) DEVICE — SNARE POLYP SENS 27MM 240CM

## (undated) DEVICE — RETRIEVER ROTH NET PLATINUM-UNIVERSAL

## (undated) DEVICE — SYR LUER SLIP TIP 50CC

## (undated) DEVICE — SUCTION YANKAUER TAPERED BULBOUS NO VENT

## (undated) DEVICE — PACK IV START WITH CHG

## (undated) DEVICE — TUBING SUCTION CONN 6FT STERILE

## (undated) DEVICE — TUBE O2 SUPL CRUSH RESIS CONN SOUTHSIDE ONLY

## (undated) DEVICE — SYR LUER SLIP TIP 30CC

## (undated) DEVICE — CATH IV SAFE BC 22G X 1" (BLUE)

## (undated) DEVICE — ELCTR GROUNDING PAD ADULT COVIDIEN

## (undated) DEVICE — FORCEP RADIAL JAW 4 JUMBO 2.8MM 3.2MM 240CM ORANGE DISP

## (undated) DEVICE — BRUSH COLONOSCOPY CYTOLOGY

## (undated) DEVICE — SENSOR O2 FINGER XL ADULT 24/BX 6BX/CA

## (undated) DEVICE — STERIS DEFENDO 3-PIECE KIT (AIR/WATER, SUCTION & BIOPSY VALVES)

## (undated) DEVICE — Device